# Patient Record
(demographics unavailable — no encounter records)

---

## 2017-01-11 NOTE — EDDOCDS
Nurse's Notes                                                                                     

[facility]                                                                         

Name: Venice Gordillo                                                                              

Age: 30 yrs                                                                                       

Sex: Female                                                                                       

: 1986                                                                                   

MRN: [id number]                                                                                     

Arrival Date: 2017                                                                          

Time: 21:02                                                                                       

Account#: [id number]                                                                              

Bed Triage 3                                                                                      

Private MD: Dina Ross                                                                           

Diagnosis: Acute pharyngitis                                                                      

                                                                                                  

Presentation:                                                                                     

                                                                                             

21:05 Presenting complaint: Patient states: ST x4 days. Denies other symptoms. Risk factors:  ttb 

      Stridor is not present. Drooling is not present. Shortness of breath is not present.        

      Cellulitis is not present. Adult Sepsis Screening: The patient does not have new or         

      worsening altered mentation. Patient's respiratory rate is less than 22. Systolic blood     

      pressure is greater than 100. Patient has a qSOFA score of 0- Negative Sepsis Screen.       

      Suicide/Homicide risk assessment- the patient denies having any suicidal and/or             

      homicidal ideations and does not present with any other emotional, behavioral or mental     

      health complaints.  Status: The patient is a  dependent. Transition of      

      care: patient was not received from another setting of care.                                

21:05 Acuity: RAMA Level 4                                                                     ttb 

21:05 Method Of Arrival: Walkin/Carried/Asstd                                                 ttb 

                                                                                                  

Triage Assessment:                                                                                

21:07 General: Appears in no apparent distress, well nourished, well groomed, Behavior is     ttb 

      appropriate for age, cooperative, pleasant. Pain: Pain currently is 8 out of 10 on a        

      pain scale. HIV screening NA for this visit Offered previously. Neurological: Level of      

      Consciousness is awake, alert, Oriented to person, place, time. EENT: Reports pain in       

      throat. Respiratory: Airway is patent Respiratory effort is even, unlabored. GI:            

      Reports nausea, Denies vomiting, pain. Derm: Skin is normal. Injury Description: No         

      known injury.                                                                               

                                                                                                  

OB/GYN:                                                                                           

21:07 LMP N/A - Uterine ablation                                                              ttb 

                                                                                                  

Historical:                                                                                       

- Allergies: PENICILLINS; Codeine Sulfate;                                                        

- Home Meds:                                                                                      

1. Calcium + Vitamin D Oral daily                                                               

     (Last dose: 2017 08:00)                                                                

- PMHx: none;                                                                                     

- PSHx: right knee; Tubal ligation; Uterine Ablation;                                             

- Social history: Smoking status: Patient uses tobacco products, heavy tobacco smoker.            

Patient uses alcohol occasionally. Patient/guardian denies using street drugs, No               

barriers to communication noted, The patient speaks fluent English, Speaks                      

appropriately for age.                                                                          

- Family history: Not pertinent.                                                                  

- : The pt / caregiver states he / she is not on anticoagulants. Home medication list             

is obtained from the patient.                                                                   

- Exposure Risk Screening:: None identified.                                                      

                                                                                                  

                                                                                                  

Screenin:45 Screening information is obtained from the patient. Fall risk: No risks identified.     jf3 

      Assistance ADL's: requires no assistance with activities of daily living. Abuse/DV          

      Screen: The patient / caregiver reports he/she is: not in a situation that causes fear,     

      pain or injury. Nutritional screening: No deficits noted. Advance Directives:               

      Currently, there is no health care proxy. home support is adequate.                         

                                                                                                  

Assessment:                                                                                       

21:45 General: Appears in no apparent distress, comfortable, Behavior is cooperative.         jf3 

      Neurological: Level of Consciousness is awake, alert, Oriented to person, place, time.      

      Cardiovascular: Capillary refill < 3 seconds. Respiratory: Airway is patent Respiratory     

      effort is even, unlabored, Respiratory pattern is regular, symmetrical. Derm: Skin is       

      pink, warm & dry.                                                                         

                                                                                                  

Vital Signs:                                                                                      

21:04  / 70; Pulse 95; Resp 16; Temp 98.1(O); Pulse Ox 99% ; Weight 57.15 kg; Height 5  cmb 

      ft. 1 in. (154.94 cm); Pain 8/10;                                                           

21:04 Body Mass Index 23.81 (57.15 kg, 154.94 cm)                                             cmb 

                                                                                                  

Vitals:                                                                                           

21:04 Log In Time: 2017 at 21:02.                                                 cmb 

21:44 Strep Screen is obtained and tested: Negative, a GATSNEG culture is ordered in [place] [address] 

      and sent.                                                                                   

                                                                                                  

ED Course:                                                                                        

21:03 Patient visited by Sangita Springer.                                                    cmb 

21:03 Patient moved to Waiting                                                                cmb 

21:04 Dina Rsos is Private Physician.                                                       cmb 

21:04 Patient moved to Pre RCE                                                                cmb 

21:06 Triage Initiated                                                                        ttb 

21:09 Patient visited by Coty Gibson RN.                                                  ttb 

21:15 Patient moved to Triage 3                                                               ttb 

21:22 Hector Stock PA is PHCP.                                                         btw 

21:22 Maggie Duenas MD is Attending Physician.                                               btw 

21:22 Patient visited by Hector Stock PA.                                              btw 

21:45 The patient / caregiver is instructed regarding the plan of care and ED course.         jf3 

21:45 No IV's were initiated during this patient's visit. No procedures done that require     jf3 

      assistance.                                                                                 

21:49 Dina Ross is Referral Physician.                                                      bt 

                                                                                                  

Order Results:                                                                                    

There are currently no results for this order.                                                    

Outcome:                                                                                          

21:49 Discharge ordered by Provider.                                                          btw 

22:03 Discharge Assessment: Patient awake, alert and oriented x 3. No cognitive and/or        jf3 

      functional deficits noted. Patient verbalized understanding of disposition                  

      instructions. patient administered narcotics - no. The following High Risk Discharge        

      criteria are identified: None. Discharged to home ambulatory, with significant other.       

      Condition: stable. Discharge instructions given to patient, significant other,              

      Instructed on discharge instructions, follow up and referral plans. Demonstrated            

      understanding of instructions, Pt was receptive of discharge instructions/ teaching. No     

      special radiology studies were completed. Property :Personal belongings accompany Pt.       

22:04 Patient left the ED.                                                                    jf3 

                                                                                                  

Signatures:                                                                                       

Hector Stock PA PA   btSangita Bowman Teresa RN                      RN   ttb                                                  

Tommy Iraheta,RN                        RN   jf3                                                  

                                                                                                  

**************************************************************************************************

MTDRITESH

## 2017-01-11 NOTE — EDDOCDS
Physician Documentation                                                                           

[facility]                                                                         

Name: Venice Gordillo                                                                              

Age: 30 yrs                                                                                       

Sex: Female                                                                                       

: 1986                                                                                   

MRN: [id number]                                                                                     

Arrival Date: 2017                                                                          

Time: 21:02                                                                                       

Account#: [id number]                                                                              

Bed Triage 3                                                                                      

Private MD: Dina Ross                                                                           

Disposition:                                                                                      

17 21:49 Discharged to Home/Self Care. Impression: Acute pharyngitis.                       

- Condition is Stable.                                                                            

- Discharge Instructions: [place] Gargle, Pharyngitis, Easy-to-Read.                           

                                                                                                  

- Medication Reconciliation, Local Pharmacy Hours form.                                           

- Follow up: Dina Ross; When: Call to arrange an appointment; Reason: Further                   

diagnostic work-up, Recheck today's complaints, Continuance of care.                            

- Problem is new.                                                                                 

- Symptoms are unchanged.                                                                         

                                                                                                  

                                                                                                  

                                                                                                  

Historical:                                                                                       

- Allergies: PENICILLINS; Codeine Sulfate;                                                        

- Home Meds:                                                                                      

1. Calcium + Vitamin D Oral daily                                                               

     (Last dose: 2017 08:00)                                                                

- PMHx: none;                                                                                     

- PSHx: right knee; Tubal ligation; Uterine Ablation;                                             

- Social history: Smoking status: Patient uses tobacco products, heavy tobacco smoker.            

Patient uses alcohol occasionally. Patient/guardian denies using street drugs, No               

barriers to communication noted, The patient speaks fluent English, Speaks                      

appropriately for age.                                                                          

- Family history: Not pertinent.                                                                  

- : The pt / caregiver states he / she is not on anticoagulants. Home medication list             

is obtained from the patient.                                                                   

- Exposure Risk Screening:: None identified.                                                      

                                                                                                  

                                                                                                  

OB/GYN:                                                                                           

                                                                                             

21:07 LMP N/A - Uterine ablation                                                              ttb 

                                                                                                  

Vital Signs:                                                                                      

21:04  / 70; Pulse 95; Resp 16; Temp 98.1(O); Pulse Ox 99% ; Weight 57.15 kg / 125.99   cmb 

      lbs; Height 5 ft. 1 in. (154.94 cm); Pain 8/10;                                             

21:04 Body Mass Index 23.81 (57.15 kg, 154.94 cm)                                             cmb 

                                                                                                  

MDM:                                                                                              

21:18 Strep Screen, Nursing ordered.                                                          btw 

21:40 GATS (NEGATIVE STREP SCREEN) Ordered.                                                   EDMS

                                                                                                  

Signatures:                                                                                       

Dispatcher [employer]                           EDMS                                                 

Hector Stock PA PA   btw                                                  

Coty Gibson RN                      RN   ttb                                                  

Tommy Iraheta RN                        RN   jf3                                                  

                                                                                                  

**************************************************************************************************

MTDD

## 2017-01-13 NOTE — EDDOCDS
Physician Documentation                                                                           

[facility]                                                                         

Name: Venice Gordillo                                                                              

Age: 30 yrs                                                                                       

Sex: Female                                                                                       

: 1986                                                                                   

MRN: [id number]                                                                                     

Arrival Date: 2017                                                                          

Time: 21:02                                                                                       

Account#: [id number]                                                                              

Bed Triage 3                                                                                      

Private MD: Dina Ross                                                                           

Disposition:                                                                                      

17 21:49 Discharged to Home/Self Care. Impression: Acute pharyngitis.                       

- Condition is Stable.                                                                            

- Discharge Instructions: Salt Water Gargle, Pharyngitis, Easy-to-Read.                           

                                                                                                  

- Medication Reconciliation, Local Pharmacy Hours form.                                           

- Follow up: Dina Ross; When: Call to arrange an appointment; Reason: Further                   

diagnostic work-up, Recheck today's complaints, Continuance of care.                            

- Problem is new.                                                                                 

- Symptoms are unchanged.                                                                         

                                                                                                  

                                                                                                  

                                                                                                  

Historical:                                                                                       

- Allergies: PENICILLINS; Codeine Sulfate;                                                        

- Home Meds:                                                                                      

1. Calcium + Vitamin D Oral daily                                                               

     (Last dose: 2017 08:00)                                                                

- PMHx: none;                                                                                     

- PSHx: right knee; Tubal ligation; Uterine Ablation;                                             

- Social history: Smoking status: Patient uses tobacco products, heavy tobacco smoker.            

Patient uses alcohol occasionally. Patient/guardian denies using street drugs, No               

barriers to communication noted, The patient speaks fluent English, Speaks                      

appropriately for age.                                                                          

- Family history: Not pertinent.                                                                  

- : The pt / caregiver states he / she is not on anticoagulants. Home medication list             

is obtained from the patient.                                                                   

- Exposure Risk Screening:: None identified.                                                      

                                                                                                  

                                                                                                  

OB/GYN:                                                                                           

                                                                                             

21:07 LMP N/A - Uterine ablation                                                              ttb 

                                                                                                  

Vital Signs:                                                                                      

21:04  / 70; Pulse 95; Resp 16; Temp 98.1(O); Pulse Ox 99% ; Weight 57.15 kg / 125.99   cmb 

      lbs; Height 5 ft. 1 in. (154.94 cm); Pain 8/10;                                             

21:04 Body Mass Index 23.81 (57.15 kg, 154.94 cm)                                             cmb 

                                                                                                  

MDM:                                                                                              

21:18 Strep Screen, Nursing ordered.                                                          btw 

21:40 GATS (NEGATIVE STREP SCREEN) Ordered.                                                   EDMS

22:05 [facility] Payment Agreement was scanned into [employer] and attached to record.               gj 

22:05 Financial registration complete.                                                        gjb 

                                                                                             

09:18 T-Sheet-- Draft Copy was scanned into [employer] and attached to record.                   gb  

                                                                                                  

Signatures:                                                                                       

Dispatcher [employer]                           EDLauren Cervantes, Reg                  Reg  gb                                                   

Hector Stock PA PA btw Conner, Teresa, RN                      RN   ttb                                                  

Tommy Iraheta RN                        RN   jfBernice Montana                                                  

                                                                                                  

The chart was reviewed and I authenticate all verbal orders and agree with the evaluation and 
treatment provided.Attachments:

                                                                                             

22:05 [facility] Payment Agreement                                                                gjb 

                                                                                             

09:18 T-Sheet-- Draft Copy                                                                    gb  

                                                                                                  

**************************************************************************************************



*** Chart Complete ***
MTDD

## 2017-01-13 NOTE — EDDOCDS
Physician Documentation                                                                           

[facility]                                                                         

Name: Venice Gordillo                                                                              

Age: 30 yrs                                                                                       

Sex: Female                                                                                       

: 1986                                                                                   

MRN: [id number]                                                                                     

Arrival Date: 2017                                                                          

Time: 21:02                                                                                       

Account#: [id number]                                                                              

Bed Triage 3                                                                                      

Private MD: Dina Ross                                                                           

Disposition:                                                                                      

17 21:49 Discharged to Home/Self Care. Impression: Acute pharyngitis.                       

- Condition is Stable.                                                                            

- Discharge Instructions: Salt Water Gargle, Pharyngitis, Easy-to-Read.                           

                                                                                                  

- Medication Reconciliation, Local Pharmacy Hours form.                                           

- Follow up: Dina Ross; When: Call to arrange an appointment; Reason: Further                   

diagnostic work-up, Recheck today's complaints, Continuance of care.                            

- Problem is new.                                                                                 

- Symptoms are unchanged.                                                                         

                                                                                                  

                                                                                                  

                                                                                                  

Historical:                                                                                       

- Allergies: PENICILLINS; Codeine Sulfate;                                                        

- Home Meds:                                                                                      

1. Calcium + Vitamin D Oral daily                                                               

     (Last dose: 2017 08:00)                                                                

- PMHx: none;                                                                                     

- PSHx: right knee; Tubal ligation; Uterine Ablation;                                             

- Social history: Smoking status: Patient uses tobacco products, heavy tobacco smoker.            

Patient uses alcohol occasionally. Patient/guardian denies using street drugs, No               

barriers to communication noted, The patient speaks fluent English, Speaks                      

appropriately for age.                                                                          

- Family history: Not pertinent.                                                                  

- : The pt / caregiver states he / she is not on anticoagulants. Home medication list             

is obtained from the patient.                                                                   

- Exposure Risk Screening:: None identified.                                                      

                                                                                                  

                                                                                                  

OB/GYN:                                                                                           

                                                                                             

21:07 LMP N/A - Uterine ablation                                                              ttb 

                                                                                                  

Vital Signs:                                                                                      

21:04  / 70; Pulse 95; Resp 16; Temp 98.1(O); Pulse Ox 99% ; Weight 57.15 kg / 125.99   cmb 

      lbs; Height 5 ft. 1 in. (154.94 cm); Pain 8/10;                                             

21:04 Body Mass Index 23.81 (57.15 kg, 154.94 cm)                                             cmb 

                                                                                                  

MDM:                                                                                              

21:18 Strep Screen, Nursing ordered.                                                          btw 

21:40 GATS (NEGATIVE STREP SCREEN) Ordered.                                                   EDMS

22:05 [facility] Payment Agreement was scanned into [employer] and attached to record.               gj 

22:05 Financial registration complete.                                                        gjb 

                                                                                             

09:18 T-Sheet-- Draft Copy was scanned into [employer] and attached to record.                   gb  

                                                                                                  

Signatures:                                                                                       

Dispatcher [employer]                           EDLauren Cervantes, Reg                  Reg  gb                                                   

Hector Stock PA PA btw Conner, Teresa, RN                      RN   ttb                                                  

Tommy Iraheta RN                        RN   jfBernice Montana                                                  

                                                                                                  

The chart was reviewed and I authenticate all verbal orders and agree with the evaluation and 
treatment provided.Attachments:

                                                                                             

22:05 [facility] Payment Agreement                                                                gjb 

                                                                                             

09:18 T-Sheet-- Draft Copy                                                                    gb  

                                                                                                  

**************************************************************************************************



*** Chart Complete ***
MTDD

## 2017-01-13 NOTE — EDDOCDS
Nurse's Notes                                                                                     

[facility]                                                                         

Name: Venice Gordillo                                                                              

Age: 30 yrs                                                                                       

Sex: Female                                                                                       

: 1986                                                                                   

MRN: [id number]                                                                                     

Arrival Date: 2017                                                                          

Time: 21:02                                                                                       

Account#: [id number]                                                                              

Bed Triage 3                                                                                      

Private MD: Dina Ross                                                                           

Diagnosis: Acute pharyngitis                                                                      

                                                                                                  

Presentation:                                                                                     

                                                                                             

21:05 Presenting complaint: Patient states: ST x4 days. Denies other symptoms. Risk factors:  ttb 

      Stridor is not present. Drooling is not present. Shortness of breath is not present.        

      Cellulitis is not present. Adult Sepsis Screening: The patient does not have new or         

      worsening altered mentation. Patient's respiratory rate is less than 22. Systolic blood     

      pressure is greater than 100. Patient has a qSOFA score of 0- Negative Sepsis Screen.       

      Suicide/Homicide risk assessment- the patient denies having any suicidal and/or             

      homicidal ideations and does not present with any other emotional, behavioral or mental     

      health complaints.  Status: The patient is a  dependent. Transition of      

      care: patient was not received from another setting of care.                                

21:05 Acuity: RAMA Level 4                                                                     ttb 

21:05 Method Of Arrival: Walkin/Carried/Asstd                                                 ttb 

                                                                                                  

Triage Assessment:                                                                                

21:07 General: Appears in no apparent distress, well nourished, well groomed, Behavior is     ttb 

      appropriate for age, cooperative, pleasant. Pain: Pain currently is 8 out of 10 on a        

      pain scale. HIV screening NA for this visit Offered previously. Neurological: Level of      

      Consciousness is awake, alert, Oriented to person, place, time. EENT: Reports pain in       

      throat. Respiratory: Airway is patent Respiratory effort is even, unlabored. GI:            

      Reports nausea, Denies vomiting, pain. Derm: Skin is normal. Injury Description: No         

      known injury.                                                                               

                                                                                                  

OB/GYN:                                                                                           

21:07 LMP N/A - Uterine ablation                                                              ttb 

                                                                                                  

Historical:                                                                                       

- Allergies: PENICILLINS; Codeine Sulfate;                                                        

- Home Meds:                                                                                      

1. Calcium + Vitamin D Oral daily                                                               

     (Last dose: 2017 08:00)                                                                

- PMHx: none;                                                                                     

- PSHx: right knee; Tubal ligation; Uterine Ablation;                                             

- Social history: Smoking status: Patient uses tobacco products, heavy tobacco smoker.            

Patient uses alcohol occasionally. Patient/guardian denies using street drugs, No               

barriers to communication noted, The patient speaks fluent English, Speaks                      

appropriately for age.                                                                          

- Family history: Not pertinent.                                                                  

- : The pt / caregiver states he / she is not on anticoagulants. Home medication list             

is obtained from the patient.                                                                   

- Exposure Risk Screening:: None identified.                                                      

                                                                                                  

                                                                                                  

Screenin:45 Screening information is obtained from the patient. Fall risk: No risks identified.     jf3 

      Assistance ADL's: requires no assistance with activities of daily living. Abuse/DV          

      Screen: The patient / caregiver reports he/she is: not in a situation that causes fear,     

      pain or injury. Nutritional screening: No deficits noted. Advance Directives:               

      Currently, there is no health care proxy. home support is adequate.                         

                                                                                                  

Assessment:                                                                                       

21:45 General: Appears in no apparent distress, comfortable, Behavior is cooperative.         jf3 

      Neurological: Level of Consciousness is awake, alert, Oriented to person, place, time.      

      Cardiovascular: Capillary refill < 3 seconds. Respiratory: Airway is patent Respiratory     

      effort is even, unlabored, Respiratory pattern is regular, symmetrical. Derm: Skin is       

      pink, warm & dry.                                                                         

                                                                                                  

Vital Signs:                                                                                      

21:04  / 70; Pulse 95; Resp 16; Temp 98.1(O); Pulse Ox 99% ; Weight 57.15 kg; Height 5  cmb 

      ft. 1 in. (154.94 cm); Pain 8/10;                                                           

21:04 Body Mass Index 23.81 (57.15 kg, 154.94 cm)                                             cmb 

                                                                                                  

Vitals:                                                                                           

21:04 Log In Time: 2017 at 21:02.                                                 cmb 

21:44 Strep Screen is obtained and tested: Negative, a GATSNEG culture is ordered in [place] [address] 

      and sent.                                                                                   

                                                                                                  

ED Course:                                                                                        

21:03 Patient visited by Sangita Springer.                                                    cmb 

21:03 Patient moved to Waiting                                                                cmb 

21:04 Dina Ross is Private Physician.                                                       cmb 

21:04 Patient moved to Pre RCE                                                                cmb 

21:06 Triage Initiated                                                                        ttb 

21:09 Patient visited by Coty Gibson RN.                                                  ttb 

21:15 Patient moved to Triage 3                                                               ttb 

21:22 Hector Stock PA is PHCP.                                                         btw 

21:22 Maggie Duenas MD is Attending Physician.                                               btw 

21:22 Patient visited by Hector Stock PA.                                              btw 

21:45 The patient / caregiver is instructed regarding the plan of care and ED course.         jf3 

21:45 No IV's were initiated during this patient's visit. No procedures done that require     jf3 

      assistance.                                                                                 

21:49 Dina Ross is Referral Physician.                                                      btw 

22:05 [facility] Payment Agreement was scanned into [employer] and attached to record.               spencer 

                                                                                             

09:18 T-Sheet-- Draft Copy was scanned into [employer] and attached to record.                   gb  

                                                                                                  

Order Results:                                                                                    

Lab Order: GATS (NEGATIVE STREP SCREEN); SPEC'M 17 21:35                                    

      Test: GATS CULTURE (NEG STREP SCR); Value: GATS RESULT NEGATIVE FOR STREP PYOGENES          

      (GROUP A); Status: F                                                                        

                                                                                                  

Outcome:                                                                                          

                                                                                             

21:49 Discharge ordered by Provider.                                                          btw 

22:03 Discharge Assessment: Patient awake, alert and oriented x 3. No cognitive and/or        jf3 

      functional deficits noted. Patient verbalized understanding of disposition                  

      instructions. patient administered narcotics - no. The following High Risk Discharge        

      criteria are identified: None. Discharged to home ambulatory, with significant other.       

      Condition: stable. Discharge instructions given to patient, significant other,              

      Instructed on discharge instructions, follow up and referral plans. Demonstrated            

      understanding of instructions, Pt was receptive of discharge instructions/ teaching. No     

      special radiology studies were completed. Property :Personal belongings accompany Pt.       

22:04 Patient left the ED.                                                                    jf3 

                                                                                                  

Signatures:                                                                                       

Lauren Piedra, Hector Earl PA PA   btw                                                  

Sangita Springer Teresa, RN                      RN   ttb                                                  

Tommy Iraheta,RN                        RN   Bernice Merritt                                                  

                                                                                                  

**************************************************************************************************



*** Chart Complete ***
MTDD